# Patient Record
Sex: MALE | URBAN - METROPOLITAN AREA
[De-identification: names, ages, dates, MRNs, and addresses within clinical notes are randomized per-mention and may not be internally consistent; named-entity substitution may affect disease eponyms.]

---

## 2023-03-28 ENCOUNTER — OFFICE VISIT (OUTPATIENT)
Dept: URBAN - METROPOLITAN AREA CLINIC 6 | Facility: CLINIC | Age: 83
End: 2023-03-28
Payer: COMMERCIAL

## 2023-03-28 DIAGNOSIS — G51.0 BELL'S PALSY: Primary | ICD-10-CM

## 2023-03-28 PROCEDURE — 92002 INTRM OPH EXAM NEW PATIENT: CPT | Performed by: OPTOMETRIST

## 2023-03-28 ASSESSMENT — INTRAOCULAR PRESSURE
OD: 18
OS: 18

## 2023-03-28 NOTE — IMPRESSION/PLAN
Impression: Bell's palsy: G51.0. 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 Start aggressive lubrication with AT's QID and night ointment. Also recommend keeping the eye closed at night using paper tape. Continue antiviral po and steroid po as Rx by his PCP.

## 2023-04-05 ENCOUNTER — OFFICE VISIT (OUTPATIENT)
Dept: URBAN - METROPOLITAN AREA CLINIC 6 | Facility: CLINIC | Age: 83
End: 2023-04-05
Payer: COMMERCIAL

## 2023-04-05 DIAGNOSIS — G51.0 BELL'S PALSY: Primary | ICD-10-CM

## 2023-04-05 PROCEDURE — 92012 INTRM OPH EXAM EST PATIENT: CPT | Performed by: OPTOMETRIST

## 2023-04-05 ASSESSMENT — INTRAOCULAR PRESSURE
OD: 19
OS: 17

## 2023-04-05 NOTE — IMPRESSION/PLAN
Impression: Bell's palsy: G51.0. 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 Also recommend keeping the eye closed at night using paper tape. Continue antiviral po and steroid po as Rx by his PCP. CT was performed and showed fluid in the ear but no other problems were noted per the patient.